# Patient Record
Sex: MALE | Race: WHITE | Employment: FULL TIME | URBAN - METROPOLITAN AREA
[De-identification: names, ages, dates, MRNs, and addresses within clinical notes are randomized per-mention and may not be internally consistent; named-entity substitution may affect disease eponyms.]

---

## 2019-04-18 PROCEDURE — 81003 URINALYSIS AUTO W/O SCOPE: CPT | Performed by: INTERNAL MEDICINE

## 2020-02-17 PROBLEM — H93.12 TINNITUS, LEFT EAR: Status: ACTIVE | Noted: 2020-02-17

## 2021-10-18 ENCOUNTER — APPOINTMENT (OUTPATIENT)
Dept: GENERAL RADIOLOGY | Facility: HOSPITAL | Age: 36
End: 2021-10-18
Attending: EMERGENCY MEDICINE
Payer: COMMERCIAL

## 2021-10-18 ENCOUNTER — HOSPITAL ENCOUNTER (EMERGENCY)
Facility: HOSPITAL | Age: 36
Discharge: HOME | End: 2021-10-18
Attending: EMERGENCY MEDICINE
Payer: COMMERCIAL

## 2021-10-18 VITALS
HEART RATE: 63 BPM | BODY MASS INDEX: 35.79 KG/M2 | HEIGHT: 70 IN | OXYGEN SATURATION: 98 % | SYSTOLIC BLOOD PRESSURE: 145 MMHG | TEMPERATURE: 98 F | DIASTOLIC BLOOD PRESSURE: 96 MMHG | RESPIRATION RATE: 16 BRPM | WEIGHT: 250 LBS

## 2021-10-18 DIAGNOSIS — R03.0 ELEVATED BLOOD PRESSURE READING IN OFFICE WITHOUT DIAGNOSIS OF HYPERTENSION: Primary | ICD-10-CM

## 2021-10-18 PROCEDURE — 36415 COLL VENOUS BLD VENIPUNCTURE: CPT

## 2021-10-18 PROCEDURE — 85025 COMPLETE CBC W/AUTO DIFF WBC: CPT

## 2021-10-18 PROCEDURE — 80048 BASIC METABOLIC PNL TOTAL CA: CPT | Performed by: EMERGENCY MEDICINE

## 2021-10-18 PROCEDURE — 80048 BASIC METABOLIC PNL TOTAL CA: CPT

## 2021-10-18 PROCEDURE — 85379 FIBRIN DEGRADATION QUANT: CPT | Performed by: EMERGENCY MEDICINE

## 2021-10-18 PROCEDURE — 85025 COMPLETE CBC W/AUTO DIFF WBC: CPT | Performed by: EMERGENCY MEDICINE

## 2021-10-18 PROCEDURE — 84484 ASSAY OF TROPONIN QUANT: CPT

## 2021-10-18 PROCEDURE — 71045 X-RAY EXAM CHEST 1 VIEW: CPT | Performed by: EMERGENCY MEDICINE

## 2021-10-18 PROCEDURE — 99284 EMERGENCY DEPT VISIT MOD MDM: CPT

## 2021-10-18 PROCEDURE — 93010 ELECTROCARDIOGRAM REPORT: CPT | Performed by: EMERGENCY MEDICINE

## 2021-10-18 PROCEDURE — 93005 ELECTROCARDIOGRAM TRACING: CPT

## 2021-10-18 PROCEDURE — 84484 ASSAY OF TROPONIN QUANT: CPT | Performed by: EMERGENCY MEDICINE

## 2021-10-18 RX ORDER — AMLODIPINE BESYLATE 5 MG/1
5 TABLET ORAL DAILY
Qty: 30 TABLET | Refills: 0 | Status: SHIPPED | OUTPATIENT
Start: 2021-10-18 | End: 2021-11-17

## 2021-10-18 NOTE — ED PROVIDER NOTES
Patient Seen in: [facility] AND [facility] Emergency Department      History   Patient presents with:  Chest Pain Angina    Stated Complaint: chest pain    Subjective:   HPI    The patient is a 41-year-old male who presents with intermittently elevated blood pre kg   SpO2 99%   BMI 35.87 kg/m²         Physical Exam  Vitals and nursing note reviewed. Constitutional:       General: He is not in acute distress. Appearance: Normal appearance. He is well-developed. He is not ill-appearing.    HENT:      Head: Norm Neutrophil Absolute 9.71 (*)     All other components within normal limits   TROPONIN I - Normal   D-DIMER - Normal   CBC WITH DIFFERENTIAL WITH PLATELET    Narrative:      The following orders were created for panel order CBC With Differential With Platele under    Risk factors   1  +2 for 3 or more  +1 for 1-2  +0 for 0  Hypercholesterolemia  Hypertension  Diabetes Mellitus  Cigarette smoking  Positive family history  Obesity    Troponin 0  +2 3x upper limit or more  +1 1-3x upper limit  +0  Less than or eq

## 2021-10-18 NOTE — ED INITIAL ASSESSMENT (HPI)
[place] [facility] he reports htn for the past several weeks post covid. Pt now reports chest discomfort and sob that began today.  [place] sent [place] [facility] [place] evaluation

## 2021-10-27 PROBLEM — E78.2 MIXED HYPERLIPIDEMIA: Status: ACTIVE | Noted: 2021-10-27

## 2022-02-15 ENCOUNTER — APPOINTMENT (OUTPATIENT)
Dept: CARDIOLOGY | Age: 37
End: 2022-02-15

## 2022-02-22 ENCOUNTER — OFFICE VISIT (OUTPATIENT)
Dept: CARDIOLOGY | Age: 37
End: 2022-02-22

## 2022-02-22 VITALS
HEART RATE: 55 BPM | HEIGHT: 70 IN | RESPIRATION RATE: 16 BRPM | BODY MASS INDEX: 32.07 KG/M2 | SYSTOLIC BLOOD PRESSURE: 122 MMHG | DIASTOLIC BLOOD PRESSURE: 80 MMHG | WEIGHT: 224 LBS

## 2022-02-22 DIAGNOSIS — I10 UNCONTROLLED HYPERTENSION: Primary | ICD-10-CM

## 2022-02-22 PROCEDURE — 3079F DIAST BP 80-89 MM HG: CPT | Performed by: INTERNAL MEDICINE

## 2022-02-22 PROCEDURE — 3074F SYST BP LT 130 MM HG: CPT | Performed by: INTERNAL MEDICINE

## 2022-02-22 PROCEDURE — 93000 ELECTROCARDIOGRAM COMPLETE: CPT | Performed by: INTERNAL MEDICINE

## 2022-02-22 PROCEDURE — 99244 OFF/OP CNSLTJ NEW/EST MOD 40: CPT | Performed by: INTERNAL MEDICINE

## 2022-02-22 RX ORDER — BISOPROLOL FUMARATE AND HYDROCHLOROTHIAZIDE 5; 6.25 MG/1; MG/1
TABLET ORAL DAILY
COMMUNITY
Start: 2021-11-30

## 2022-02-22 SDOH — HEALTH STABILITY: PHYSICAL HEALTH: ON AVERAGE, HOW MANY DAYS PER WEEK DO YOU ENGAGE IN MODERATE TO STRENUOUS EXERCISE (LIKE A BRISK WALK)?: 7 DAYS

## 2022-02-22 ASSESSMENT — PATIENT HEALTH QUESTIONNAIRE - PHQ9
SUM OF ALL RESPONSES TO PHQ9 QUESTIONS 1 AND 2: 0
SUM OF ALL RESPONSES TO PHQ9 QUESTIONS 1 AND 2: 0
CLINICAL INTERPRETATION OF PHQ2 SCORE: NO FURTHER SCREENING NEEDED
1. LITTLE INTEREST OR PLEASURE IN DOING THINGS: NOT AT ALL
2. FEELING DOWN, DEPRESSED OR HOPELESS: NOT AT ALL

## 2022-03-08 ENCOUNTER — HOSPITAL ENCOUNTER (OUTPATIENT)
Dept: ULTRASOUND IMAGING | Age: 37
Discharge: HOME | End: 2022-03-08
Attending: INTERNAL MEDICINE

## 2022-03-08 DIAGNOSIS — I10 UNCONTROLLED HYPERTENSION: ICD-10-CM

## 2022-03-08 PROCEDURE — 93975 VASCULAR STUDY: CPT

## 2022-03-18 ENCOUNTER — HOSPITAL ENCOUNTER (OUTPATIENT)
Dept: CARDIOLOGY | Age: 37
Discharge: HOME | End: 2022-03-18
Attending: INTERNAL MEDICINE

## 2022-03-18 DIAGNOSIS — I10 UNCONTROLLED HYPERTENSION: ICD-10-CM

## 2022-03-18 LAB
AORTIC VALVE AREA: NORMAL
AV MEAN GRADIENT (AVMG): 3
AV MEAN VELOCITY (AVMV): 0.8
AV PEAK GRADIENT (AVPG): 6
AV PEAK VELOCITY (AVPV): 1.2
AV STENOSIS SEVERITY TEXT: NORMAL
LV EF: NORMAL %

## 2022-03-18 PROCEDURE — 93306 TTE W/DOPPLER COMPLETE: CPT | Performed by: INTERNAL MEDICINE

## 2022-03-18 PROCEDURE — 93306 TTE W/DOPPLER COMPLETE: CPT

## 2022-03-18 PROCEDURE — 76376 3D RENDER W/INTRP POSTPROCES: CPT | Performed by: INTERNAL MEDICINE

## 2022-03-18 ASSESSMENT — PATIENT HEALTH QUESTIONNAIRE - PHQ9
CLINICAL INTERPRETATION OF PHQ2 SCORE: NO FURTHER SCREENING NEEDED
SUM OF ALL RESPONSES TO PHQ9 QUESTIONS 1 AND 2: 0
1. LITTLE INTEREST OR PLEASURE IN DOING THINGS: NOT AT ALL
2. FEELING DOWN, DEPRESSED OR HOPELESS: NOT AT ALL
SUM OF ALL RESPONSES TO PHQ9 QUESTIONS 1 AND 2: 0

## 2022-03-22 ENCOUNTER — OFFICE VISIT (OUTPATIENT)
Dept: CARDIOLOGY | Age: 37
End: 2022-03-22

## 2022-03-22 VITALS
HEIGHT: 70 IN | BODY MASS INDEX: 32.19 KG/M2 | SYSTOLIC BLOOD PRESSURE: 124 MMHG | HEART RATE: 60 BPM | WEIGHT: 224.87 LBS | RESPIRATION RATE: 16 BRPM | DIASTOLIC BLOOD PRESSURE: 80 MMHG

## 2022-03-22 DIAGNOSIS — I10 PRIMARY HYPERTENSION: Primary | ICD-10-CM

## 2022-03-22 PROCEDURE — 3074F SYST BP LT 130 MM HG: CPT | Performed by: INTERNAL MEDICINE

## 2022-03-22 PROCEDURE — 3079F DIAST BP 80-89 MM HG: CPT | Performed by: INTERNAL MEDICINE

## 2022-03-22 PROCEDURE — 99214 OFFICE O/P EST MOD 30 MIN: CPT | Performed by: INTERNAL MEDICINE

## 2022-03-22 SDOH — HEALTH STABILITY: PHYSICAL HEALTH: ON AVERAGE, HOW MANY DAYS PER WEEK DO YOU ENGAGE IN MODERATE TO STRENUOUS EXERCISE (LIKE A BRISK WALK)?: 2 DAYS

## 2025-02-21 RX ORDER — BISOPROLOL FUMARATE AND HYDROCHLOROTHIAZIDE 5; 6.25 MG/1; MG/1
1 TABLET ORAL DAILY
COMMUNITY
Start: 2024-11-21

## 2025-02-24 RX ORDER — METRONIDAZOLE 500 MG/1
TABLET ORAL
COMMUNITY
Start: 2025-01-27 | End: 2025-03-02

## 2025-02-24 RX ORDER — SODIUM CHLORIDE, SODIUM LACTATE, POTASSIUM CHLORIDE, CALCIUM CHLORIDE 600; 310; 30; 20 MG/100ML; MG/100ML; MG/100ML; MG/100ML
INJECTION, SOLUTION INTRAVENOUS CONTINUOUS
Status: CANCELLED | OUTPATIENT
Start: 2025-02-24

## 2025-02-24 RX ORDER — NEOMYCIN SULFATE 500 MG/1
TABLET ORAL
COMMUNITY
Start: 2025-01-27 | End: 2025-03-02

## 2025-02-25 ENCOUNTER — LAB ENCOUNTER (OUTPATIENT)
Dept: LAB | Facility: HOSPITAL | Age: 40
End: 2025-02-25
Attending: SURGERY
Payer: COMMERCIAL

## 2025-02-25 DIAGNOSIS — Z01.818 PREOP TESTING: ICD-10-CM

## 2025-02-25 LAB
ANTIBODY SCREEN: NEGATIVE
RH BLOOD TYPE: POSITIVE

## 2025-02-25 PROCEDURE — 36415 COLL VENOUS BLD VENIPUNCTURE: CPT

## 2025-02-25 PROCEDURE — 86850 RBC ANTIBODY SCREEN: CPT

## 2025-02-25 PROCEDURE — 86901 BLOOD TYPING SEROLOGIC RH(D): CPT

## 2025-02-25 PROCEDURE — 86900 BLOOD TYPING SEROLOGIC ABO: CPT

## 2025-02-28 ENCOUNTER — ANESTHESIA EVENT (OUTPATIENT)
Dept: SURGERY | Facility: HOSPITAL | Age: 40
End: 2025-02-28
Payer: COMMERCIAL

## 2025-02-28 ENCOUNTER — HOSPITAL ENCOUNTER (INPATIENT)
Facility: HOSPITAL | Age: 40
LOS: 2 days | Discharge: HOME | End: 2025-03-02
Attending: SURGERY | Admitting: SURGERY
Payer: COMMERCIAL

## 2025-02-28 ENCOUNTER — ANESTHESIA (OUTPATIENT)
Dept: SURGERY | Facility: HOSPITAL | Age: 40
End: 2025-02-28
Payer: COMMERCIAL

## 2025-02-28 DIAGNOSIS — Z01.818 PREOP TESTING: Primary | ICD-10-CM

## 2025-02-28 DIAGNOSIS — K57.32 DIVERTICULITIS LARGE INTESTINE W/O PERFORATION OR ABSCESS W/O BLEEDING: ICD-10-CM

## 2025-02-28 LAB
GLUCOSE BLDC GLUCOMTR-MCNC: 132 MG/DL (ref 70–99)
RH BLOOD TYPE: POSITIVE

## 2025-02-28 PROCEDURE — 4A1BXSH MONITORING OF GASTROINTESTINAL VASCULAR PERFUSION USING INDOCYANINE GREEN DYE, EXTERNAL APPROACH: ICD-10-PCS | Performed by: SURGERY

## 2025-02-28 PROCEDURE — 0DTN4ZZ RESECTION OF SIGMOID COLON, PERCUTANEOUS ENDOSCOPIC APPROACH: ICD-10-PCS | Performed by: SURGERY

## 2025-02-28 PROCEDURE — 82962 GLUCOSE BLOOD TEST: CPT

## 2025-02-28 PROCEDURE — 88307 TISSUE EXAM BY PATHOLOGIST: CPT | Performed by: SURGERY

## 2025-02-28 PROCEDURE — 0DJD8ZZ INSPECTION OF LOWER INTESTINAL TRACT, VIA NATURAL OR ARTIFICIAL OPENING ENDOSCOPIC: ICD-10-PCS | Performed by: SURGERY

## 2025-02-28 RX ORDER — METOCLOPRAMIDE HYDROCHLORIDE 5 MG/ML
10 INJECTION INTRAMUSCULAR; INTRAVENOUS ONCE
Status: COMPLETED | OUTPATIENT
Start: 2025-02-28 | End: 2025-02-28

## 2025-02-28 RX ORDER — SODIUM CHLORIDE 9 MG/ML
INJECTION, SOLUTION INTRAVENOUS CONTINUOUS PRN
Status: DISCONTINUED | OUTPATIENT
Start: 2025-02-28 | End: 2025-02-28 | Stop reason: SURG

## 2025-02-28 RX ORDER — KETOROLAC TROMETHAMINE 30 MG/ML
INJECTION, SOLUTION INTRAMUSCULAR; INTRAVENOUS AS NEEDED
Status: DISCONTINUED | OUTPATIENT
Start: 2025-02-28 | End: 2025-02-28 | Stop reason: SURG

## 2025-02-28 RX ORDER — ACETAMINOPHEN 500 MG
1000 TABLET ORAL 3 TIMES DAILY
Status: DISCONTINUED | OUTPATIENT
Start: 2025-02-28 | End: 2025-03-02

## 2025-02-28 RX ORDER — ONDANSETRON 2 MG/ML
4 INJECTION INTRAMUSCULAR; INTRAVENOUS EVERY 6 HOURS PRN
Status: DISCONTINUED | OUTPATIENT
Start: 2025-02-28 | End: 2025-02-28 | Stop reason: HOSPADM

## 2025-02-28 RX ORDER — NALOXONE HYDROCHLORIDE 0.4 MG/ML
80 INJECTION, SOLUTION INTRAMUSCULAR; INTRAVENOUS; SUBCUTANEOUS AS NEEDED
Status: DISCONTINUED | OUTPATIENT
Start: 2025-02-28 | End: 2025-02-28 | Stop reason: HOSPADM

## 2025-02-28 RX ORDER — ROCURONIUM BROMIDE 10 MG/ML
INJECTION, SOLUTION INTRAVENOUS AS NEEDED
Status: DISCONTINUED | OUTPATIENT
Start: 2025-02-28 | End: 2025-02-28 | Stop reason: SURG

## 2025-02-28 RX ORDER — BUPIVACAINE HYDROCHLORIDE 2.5 MG/ML
INJECTION, SOLUTION EPIDURAL; INFILTRATION; INTRACAUDAL AS NEEDED
Status: DISCONTINUED | OUTPATIENT
Start: 2025-02-28 | End: 2025-02-28 | Stop reason: HOSPADM

## 2025-02-28 RX ORDER — METOCLOPRAMIDE 10 MG/1
10 TABLET ORAL ONCE
Status: COMPLETED | OUTPATIENT
Start: 2025-02-28 | End: 2025-02-28

## 2025-02-28 RX ORDER — MORPHINE SULFATE 4 MG/ML
2 INJECTION, SOLUTION INTRAMUSCULAR; INTRAVENOUS EVERY 10 MIN PRN
Status: DISCONTINUED | OUTPATIENT
Start: 2025-02-28 | End: 2025-02-28 | Stop reason: HOSPADM

## 2025-02-28 RX ORDER — PROCHLORPERAZINE EDISYLATE 5 MG/ML
5 INJECTION INTRAMUSCULAR; INTRAVENOUS EVERY 8 HOURS PRN
Status: DISCONTINUED | OUTPATIENT
Start: 2025-02-28 | End: 2025-02-28 | Stop reason: HOSPADM

## 2025-02-28 RX ORDER — HYDROMORPHONE HYDROCHLORIDE 1 MG/ML
0.8 INJECTION, SOLUTION INTRAMUSCULAR; INTRAVENOUS; SUBCUTANEOUS EVERY 2 HOUR PRN
Status: DISCONTINUED | OUTPATIENT
Start: 2025-02-28 | End: 2025-03-02

## 2025-02-28 RX ORDER — HYDROMORPHONE HYDROCHLORIDE 1 MG/ML
0.2 INJECTION, SOLUTION INTRAMUSCULAR; INTRAVENOUS; SUBCUTANEOUS EVERY 5 MIN PRN
Status: DISCONTINUED | OUTPATIENT
Start: 2025-02-28 | End: 2025-02-28 | Stop reason: HOSPADM

## 2025-02-28 RX ORDER — LIDOCAINE HYDROCHLORIDE ANHYDROUS AND DEXTROSE MONOHYDRATE .8; 5 G/100ML; G/100ML
INJECTION, SOLUTION INTRAVENOUS CONTINUOUS PRN
Status: DISCONTINUED | OUTPATIENT
Start: 2025-02-28 | End: 2025-02-28 | Stop reason: SURG

## 2025-02-28 RX ORDER — FAMOTIDINE 20 MG/1
20 TABLET, FILM COATED ORAL ONCE
Status: COMPLETED | OUTPATIENT
Start: 2025-02-28 | End: 2025-02-28

## 2025-02-28 RX ORDER — SODIUM CHLORIDE, SODIUM LACTATE, POTASSIUM CHLORIDE, CALCIUM CHLORIDE 600; 310; 30; 20 MG/100ML; MG/100ML; MG/100ML; MG/100ML
INJECTION, SOLUTION INTRAVENOUS CONTINUOUS
Status: DISCONTINUED | OUTPATIENT
Start: 2025-02-28 | End: 2025-02-28 | Stop reason: HOSPADM

## 2025-02-28 RX ORDER — HEPARIN SODIUM 5000 [USP'U]/ML
5000 INJECTION, SOLUTION INTRAVENOUS; SUBCUTANEOUS ONCE
Status: COMPLETED | OUTPATIENT
Start: 2025-02-28 | End: 2025-02-28

## 2025-02-28 RX ORDER — LIDOCAINE HYDROCHLORIDE 10 MG/ML
INJECTION, SOLUTION EPIDURAL; INFILTRATION; INTRACAUDAL; PERINEURAL AS NEEDED
Status: DISCONTINUED | OUTPATIENT
Start: 2025-02-28 | End: 2025-02-28 | Stop reason: SURG

## 2025-02-28 RX ORDER — ACETAMINOPHEN 500 MG
1000 TABLET ORAL ONCE
Status: COMPLETED | OUTPATIENT
Start: 2025-02-28 | End: 2025-02-28

## 2025-02-28 RX ORDER — IBUPROFEN 600 MG/1
600 TABLET, FILM COATED ORAL EVERY 6 HOURS PRN
Status: DISCONTINUED | OUTPATIENT
Start: 2025-02-28 | End: 2025-03-02

## 2025-02-28 RX ORDER — MAGNESIUM OXIDE 400 MG/1
400 TABLET ORAL DAILY
Status: DISCONTINUED | OUTPATIENT
Start: 2025-02-28 | End: 2025-03-02

## 2025-02-28 RX ORDER — ONDANSETRON 2 MG/ML
4 INJECTION INTRAMUSCULAR; INTRAVENOUS EVERY 6 HOURS PRN
Status: DISCONTINUED | OUTPATIENT
Start: 2025-02-28 | End: 2025-03-02

## 2025-02-28 RX ORDER — MIDAZOLAM HYDROCHLORIDE 1 MG/ML
INJECTION INTRAMUSCULAR; INTRAVENOUS AS NEEDED
Status: DISCONTINUED | OUTPATIENT
Start: 2025-02-28 | End: 2025-02-28 | Stop reason: SURG

## 2025-02-28 RX ORDER — HYDROMORPHONE HYDROCHLORIDE 1 MG/ML
0.4 INJECTION, SOLUTION INTRAMUSCULAR; INTRAVENOUS; SUBCUTANEOUS EVERY 2 HOUR PRN
Status: DISCONTINUED | OUTPATIENT
Start: 2025-02-28 | End: 2025-03-02

## 2025-02-28 RX ORDER — PHENYLEPHRINE HCL 10 MG/ML
VIAL (ML) INJECTION AS NEEDED
Status: DISCONTINUED | OUTPATIENT
Start: 2025-02-28 | End: 2025-02-28 | Stop reason: SURG

## 2025-02-28 RX ORDER — METRONIDAZOLE 500 MG/100ML
500 INJECTION, SOLUTION INTRAVENOUS ONCE
Status: COMPLETED | OUTPATIENT
Start: 2025-02-28 | End: 2025-02-28

## 2025-02-28 RX ORDER — HEPARIN SODIUM 5000 [USP'U]/ML
5000 INJECTION, SOLUTION INTRAVENOUS; SUBCUTANEOUS EVERY 8 HOURS SCHEDULED
Status: DISCONTINUED | OUTPATIENT
Start: 2025-03-01 | End: 2025-03-02

## 2025-02-28 RX ORDER — HYDROMORPHONE HYDROCHLORIDE 1 MG/ML
0.4 INJECTION, SOLUTION INTRAMUSCULAR; INTRAVENOUS; SUBCUTANEOUS EVERY 5 MIN PRN
Status: DISCONTINUED | OUTPATIENT
Start: 2025-02-28 | End: 2025-02-28 | Stop reason: HOSPADM

## 2025-02-28 RX ORDER — SODIUM CHLORIDE 9 MG/ML
INJECTION, SOLUTION INTRAVENOUS CONTINUOUS
Status: DISCONTINUED | OUTPATIENT
Start: 2025-02-28 | End: 2025-03-02

## 2025-02-28 RX ORDER — HYDROMORPHONE HYDROCHLORIDE 1 MG/ML
0.6 INJECTION, SOLUTION INTRAMUSCULAR; INTRAVENOUS; SUBCUTANEOUS EVERY 5 MIN PRN
Status: DISCONTINUED | OUTPATIENT
Start: 2025-02-28 | End: 2025-02-28 | Stop reason: HOSPADM

## 2025-02-28 RX ORDER — TRAMADOL HYDROCHLORIDE 50 MG/1
50 TABLET ORAL EVERY 6 HOURS PRN
Status: DISCONTINUED | OUTPATIENT
Start: 2025-02-28 | End: 2025-03-02

## 2025-02-28 RX ORDER — MORPHINE SULFATE 4 MG/ML
4 INJECTION, SOLUTION INTRAMUSCULAR; INTRAVENOUS EVERY 10 MIN PRN
Status: DISCONTINUED | OUTPATIENT
Start: 2025-02-28 | End: 2025-02-28 | Stop reason: HOSPADM

## 2025-02-28 RX ORDER — DEXAMETHASONE SODIUM PHOSPHATE 4 MG/ML
VIAL (ML) INJECTION AS NEEDED
Status: DISCONTINUED | OUTPATIENT
Start: 2025-02-28 | End: 2025-02-28 | Stop reason: SURG

## 2025-02-28 RX ORDER — SODIUM CHLORIDE, SODIUM LACTATE, POTASSIUM CHLORIDE, CALCIUM CHLORIDE 600; 310; 30; 20 MG/100ML; MG/100ML; MG/100ML; MG/100ML
2 INJECTION, SOLUTION INTRAVENOUS CONTINUOUS
Status: DISCONTINUED | OUTPATIENT
Start: 2025-02-28 | End: 2025-03-02

## 2025-02-28 RX ORDER — ONDANSETRON 2 MG/ML
INJECTION INTRAMUSCULAR; INTRAVENOUS AS NEEDED
Status: DISCONTINUED | OUTPATIENT
Start: 2025-02-28 | End: 2025-02-28 | Stop reason: SURG

## 2025-02-28 RX ORDER — FAMOTIDINE 10 MG/ML
20 INJECTION, SOLUTION INTRAVENOUS ONCE
Status: COMPLETED | OUTPATIENT
Start: 2025-02-28 | End: 2025-02-28

## 2025-02-28 RX ORDER — OXYCODONE HYDROCHLORIDE 5 MG/1
10 TABLET ORAL EVERY 4 HOURS PRN
Status: DISCONTINUED | OUTPATIENT
Start: 2025-02-28 | End: 2025-03-02

## 2025-02-28 RX ORDER — OXYCODONE HYDROCHLORIDE 5 MG/1
5 TABLET ORAL EVERY 4 HOURS PRN
Status: DISCONTINUED | OUTPATIENT
Start: 2025-02-28 | End: 2025-03-02

## 2025-02-28 RX ORDER — MORPHINE SULFATE 10 MG/ML
6 INJECTION, SOLUTION INTRAMUSCULAR; INTRAVENOUS EVERY 10 MIN PRN
Status: DISCONTINUED | OUTPATIENT
Start: 2025-02-28 | End: 2025-02-28 | Stop reason: HOSPADM

## 2025-02-28 RX ORDER — SODIUM CHLORIDE, SODIUM LACTATE, POTASSIUM CHLORIDE, CALCIUM CHLORIDE 600; 310; 30; 20 MG/100ML; MG/100ML; MG/100ML; MG/100ML
INJECTION, SOLUTION INTRAVENOUS CONTINUOUS PRN
Status: DISCONTINUED | OUTPATIENT
Start: 2025-02-28 | End: 2025-02-28

## 2025-02-28 RX ORDER — GLYCOPYRROLATE 0.2 MG/ML
INJECTION, SOLUTION INTRAMUSCULAR; INTRAVENOUS AS NEEDED
Status: DISCONTINUED | OUTPATIENT
Start: 2025-02-28 | End: 2025-02-28 | Stop reason: SURG

## 2025-02-28 RX ADMIN — PHENYLEPHRINE HCL 100 MCG: 10 MG/ML VIAL (ML) INJECTION at 08:32:00

## 2025-02-28 RX ADMIN — SODIUM CHLORIDE: 9 INJECTION, SOLUTION INTRAVENOUS at 07:36:00

## 2025-02-28 RX ADMIN — DEXAMETHASONE SODIUM PHOSPHATE 4 MG: 4 MG/ML VIAL (ML) INJECTION at 07:39:00

## 2025-02-28 RX ADMIN — LIDOCAINE HYDROCHLORIDE 50 MG: 10 INJECTION, SOLUTION EPIDURAL; INFILTRATION; INTRACAUDAL; PERINEURAL at 07:39:00

## 2025-02-28 RX ADMIN — PHENYLEPHRINE HCL 100 MCG: 10 MG/ML VIAL (ML) INJECTION at 08:33:00

## 2025-02-28 RX ADMIN — PHENYLEPHRINE HCL 100 MCG: 10 MG/ML VIAL (ML) INJECTION at 09:43:00

## 2025-02-28 RX ADMIN — SODIUM CHLORIDE: 9 INJECTION, SOLUTION INTRAVENOUS at 10:21:00

## 2025-02-28 RX ADMIN — PHENYLEPHRINE HCL 100 MCG: 10 MG/ML VIAL (ML) INJECTION at 08:27:00

## 2025-02-28 RX ADMIN — ROCURONIUM BROMIDE 20 MG: 10 INJECTION, SOLUTION INTRAVENOUS at 09:30:00

## 2025-02-28 RX ADMIN — METRONIDAZOLE 500 MG: 500 INJECTION, SOLUTION INTRAVENOUS at 07:50:00

## 2025-02-28 RX ADMIN — GLYCOPYRROLATE 0.2 MG: 0.2 INJECTION, SOLUTION INTRAMUSCULAR; INTRAVENOUS at 07:39:00

## 2025-02-28 RX ADMIN — SODIUM CHLORIDE: 9 INJECTION, SOLUTION INTRAVENOUS at 09:19:00

## 2025-02-28 RX ADMIN — ONDANSETRON 4 MG: 2 INJECTION INTRAMUSCULAR; INTRAVENOUS at 09:43:00

## 2025-02-28 RX ADMIN — KETOROLAC TROMETHAMINE 30 MG: 30 INJECTION, SOLUTION INTRAMUSCULAR; INTRAVENOUS at 10:23:00

## 2025-02-28 RX ADMIN — LIDOCAINE HYDROCHLORIDE ANHYDROUS AND DEXTROSE MONOHYDRATE 1 MG/MIN: .8; 5 INJECTION, SOLUTION INTRAVENOUS at 07:52:00

## 2025-02-28 RX ADMIN — ROCURONIUM BROMIDE 60 MG: 10 INJECTION, SOLUTION INTRAVENOUS at 07:39:00

## 2025-02-28 RX ADMIN — ROCURONIUM BROMIDE 20 MG: 10 INJECTION, SOLUTION INTRAVENOUS at 08:48:00

## 2025-02-28 RX ADMIN — SODIUM CHLORIDE: 9 INJECTION, SOLUTION INTRAVENOUS at 07:50:00

## 2025-02-28 RX ADMIN — MIDAZOLAM HYDROCHLORIDE 2 MG: 1 INJECTION INTRAMUSCULAR; INTRAVENOUS at 07:38:00

## 2025-02-28 NOTE — ANESTHESIA POSTPROCEDURE EVALUATION
Patient: Dung Silva    Procedure Summary       Date: 02/28/25 Room / Location: [facility] MAIN OR 03 / [facility] MAIN OR    Anesthesia Start: 0736 Anesthesia Stop:     Procedure: Laparoscopic sigmoid colon resection (Abdomen) Diagnosis: (Diverticulitis)    Surgeons: Teofilo Escobedo MD Anesthesiologist: Maximus Downs MD    Anesthesia Type: general ASA Status: 2            Anesthesia Type: general    Vitals Value Taken Time   /117 02/28/25 1029   Temp 99 °F (37.2 °C) 02/28/25 1029   Pulse 88 02/28/25 1029   Resp 20 02/28/25 1029   SpO2 98 % 02/28/25 1029   Vitals shown include unfiled device data.    EM AN Post Evaluation:   Patient Evaluated in PACU  Patient Participation: complete - patient participated  Level of Consciousness: sleepy but conscious  Pain Score: 0  Pain Management: adequate  Airway Patency:patent  Dental exam unchanged from preop  Yes    Cardiovascular Status: stable and acceptable  Respiratory Status: acceptable and room air  Postoperative Hydration acceptable      MICKY JOHNSON CRNA  2/28/2025 10:30 AM

## 2025-02-28 NOTE — H&P
HPI: 39-year-old male patient of Dr. Sanchez presents for evaluation of sigmoid diverticulitis.    He was recently admitted to [facility] for 4 days for diverticulitis. He states the symptoms started on December 17 as discomfort, and within a day he had markedly increased pain and a fever. He was admitted to the hospital from days 2-6 of the attack. He now feels better.  He was seen by Dr. Swann from surgery. He was discharged on IV antibiotics through Dr. Epifanio Overton at [facility]. The patient is administering the antibiotics himself. The plan is for 4 weeks of IV antibiotics. Planning a follow-up CT in 2 weeks, and colonoscopy and surgery in 6 weeks.  Now, he has no significant pain, eating pretty much normally, having normal bowel movements.  In general, he states he has regular bowel movements. He has some digestive issues, but nothing like this. Generally feels he has a healthy gastrointestinal tract. He states he eats eclectically. This was his first episode. He has never had a colonoscopy.    Past surgical history: Negative  Family history: , runs [employer], able to do it with light duty if need be  Family history: Prevalent colon issues. History of colon and pancreatic cancer. He his grandfather, mother, and uncle had diverticulitis issues.    Unaccompanied.    ALLERGIES:  Lisinopril PALPITATIONS, OTHER (SEE COMMENTS),  UNKNOWN  Comment:Night terrors sweats  Codeine RASH  CURRENT MEDS:  Current Outpatient Medications   Medication Sig Dispense Refill   neomycin 500 MG Oral Tab Take 2PO the day before surgery at 5,7,9 PM 6 tablet 0   metRONIDAZOLE (FLAGYL) 500 MG Oral Tab Take 1PO the day before surgery at 5,7,9 PM 3 tablet 0   bisoprolol-hydrochlorothiazide 5-6.25 MG Oral Tab Take 1 tablet by mouth daily. 90 tablet 1   Halobetasol Propionate (ULTRAVATE) 0.05 % External Ointment Apply to rash areas on hands, feet, and legs twice daily 50 g 2   Meloxicam 15 MG Oral Tab Take 15 mg by mouth  daily.   ALPRAZolam 0.5 MG Oral Tab Take 1 tablet (0.5 mg total) by mouth 2 (two) times daily as needed. 10 tablet 0     ________________________________________________________________________  ROS:  GENERAL HEALTH: otherwise feels well  HEENT: denies nasal congestion, sinus pain or sore throat; hearing loss negative  RESPIRATORY: denies shortness of breath, wheezing or cough   CARDIOVASCULAR: denies chest pain or OZUNA; no palpitations   GI: denies nausea, vomiting, constipation, diarrhea; no rectal bleeding; no heartburn--see HPI  GENITAL/: no dysuria, urgency or frequency  HEMATOLOGY: denies hx anemia; denies bruising or excessive bleeding  ________________________________________________________________________  PHYSICAL EXAM:  GENERAL: well developed, well nourished male, in no apparent distress  NECK: supple; no JVD  RESPIRATORY: lungs clear bilaterally  CARDIOVASCULAR: RRR  ABDOMEN: normal active BS+, soft, nondistended; no masses; trace subjective \"awareness\" with deep palpation of the left lower quadrant, \"not pain\"  GENITAL/: no inguinal hernias  RECTAL: not examined  LYMPHATIC: no lymphadenopathy  MUSCULOSKELETAL: Right upper extremity PICC line    CT ABDOMEN+PELVIS(CONTRAST ONLY)(CPT=74177)  Order: [id number]  Impression    1. Marked thickening of the sigmoid colon with extensive pericolonic fat stranding and extraluminal air which may be due to perforated diverticulitis. Differential would also include colitis and neoplasm. Fluid in the ascending colon which may be due to diarrhea.  2. Bilateral 1-2 mm pulmonary nodules. If patient is low risk without risk factors such as smoking or significant radon exposure, no routine follow up is recommended. If patient is high risk, consider optional CT at 12 months. (Guidelines for Management of Incidental Pulmonary Nodules Detected on CT images: From the Fleischner Society 2017: Radiology Volume 284 Issue 1 July 2017)  3. Findings of perforated diverticulitis  communicated with Teodoro Ennis, physician assistant by Epic message at 1305 on 12/19/2024    Created by: Maria E Tierney MD  Signed by: Maria E Tierney MD  Signed on: 12/19/2024 1:14 PM  Location: [place] [id number]      Narrative    Exam Description: CT ABDOMEN PELVIS W IV CONTRAST  Exam Time (approximate): 12/19/2024 12:44 PM  Reason for Study: abd pain  HISTORY: Periumbilical pain for several days with irregularity of the bowel. History of hematuria    COMPARISON: 5/27/2023 CT chest abdomen pelvis    TECHNIQUE: CT of the abdomen and pelvis was obtained with coronal reconstructions following the administration of only intravenous contrast. Absence of oral contrast limits evaluation of the stomach, small bowel and colon. 100 cc of Isovue-370 was injected. Dose reduction techniques employed include automatic exposure control and adjustment of kV and mA for body weight.    DOSE: Total exam DLP is 1117.20 mGy-cm.    FINDINGS:  Lung bases: 2 mm right costophrenic angle nodule image 25 series 4. 1 mm right middle lobe nodule image 8 series 4. 2 mm left lower lobe nodule image 11 series 4. 2 mm left lower lobe subpleural nodule image 14  Pneumoperitoneum: Extraluminal air in the mesentery  Esophagus/stomach: Small amount of air in the distal esophagus. Collapsed stomach with small amount of air and fluid.    Liver: No focal lesions.  Gallbladder/biliary: No radiodense calculi. No common duct dilatation.    Spleen: Normal  Pancreas: Normal  Adrenals: Normal right adrenal. Minimal thickening of the left lateral adrenal limb    Kidneys: Symmetrical excretion without hydronephrosis    Upper abdominal/retroperitoneal lymph nodes: Small. Not enlarged.  Pelvic/inguinal nodes: Not enlarged    Anterior abdominal wall/soft tissues: Tiny amount of fat at the umbilicus without significant hernia  Vascular: Normal caliber aorta    Pelvis: Seminal vesicles and prostate normal for age. Trace pelvic free fluid  Bladder: Mild bladder wall  thickening with mild bladder distention    Peritoneum/mesentery: Extraluminal air, soft tissue stranding.    GI tract: Marked thickening of the sigmoid colon with extensive pericolonic stranding and foci of extraluminal air predominantly posterior and superior to the sigmoid which may be sequela of diverticulitis but could also be sequela of colitis. Recommend follow up to exclude underlying neoplasm. Tiny amount of fluid in the distal collapsed rectosigmoid. Collapsed descending colon and predominantly collapsed transverse colon with multiple diverticula. Fluid in the ascending colon which may be due to diarrhea. Normal appendix with the tip extending to the area of pericolonic inflammation. Normal caliber small bowel loops without findings of obstruction.    Osseous structures: Normal for age  Exam End: 12/19/24 12:45 PM Last Resulted: 12/19/24 1:14 PM   Received From: [facility] Result Received: 12/22/24 7:06 PM     Reviewing of images not possible as studies from outside of the system. He will bring us the CT images on disc.    ________________________________________________________________________  ASSESSMENT/ PLAN: Sigmoid Diverticulitis, first bout, essentially resolved with antibiotic therapy    We discussed the natural history and pathophysiology of diverticulitis. We discussed the likelihood of additional bouts, complications such as perforation with peritonitis, obstruction from stricture formation, fistulas, abscess, bleeding, etc. His young age and family history were discussed, putting him at high risk for additional episodes. Furthermore he had what appears to be a contained microperforation at this episode. The role and timing of elective resection relative to the most recent episode was reviewed. We discussed the current plan of finishing IV antibiotics, repeat CT, colonoscopy, and then surgery. There was a concern about the possibility of the colonoscopy being detrimental to his recent episode  of perforated diverticulitis. We discussed that we typically would do a colonoscopy after a period of healing, typically no sooner than 6 weeks after the attack. We discussed the role of observation, and we do not favor it based on the above features of his situation.    He will finish the IV antibiotics through the ID doctor. He was given gastroenterology names for colonoscopy. We will track his follow-up CT scan, to be done at [facility]. He will get the images of the prior CT on disc, for uploading, review, and comparison.    Recommend laparoscopic-assisted low anterior sigmoid resection. RBA disc including bleeding, infection, leak, recurrent attacks, stenosis, adhesions/SBO, open operation, anesthesia, recovery, hospital stay, RTW and activity, etc.  I recommend doing the surgery no sooner than 6 weeks from the attack. He is going to [place] at the end of March. If he does not do the procedure in early to mid March, we will do it after spring break.    , [facility], bowel prep, surgical assist, SCIP antibiotics, SCDs, preoperative subcutaneous heparin. Continue the bisoprolol, hold NSAIDs.    The diverticulitis brochure was given to the patient, and all questions were answered.  Thank you for involving me in the care of your patient.

## 2025-02-28 NOTE — ANESTHESIA PREPROCEDURE EVALUATION
Anesthesia PreOp Note    HPI:     Dung Silva is a 39 year old male who presents for preoperative consultation requested by: Teofilo Escobedo MD    Date of Surgery: 2/28/2025    Procedure(s):  Laparoscopic sigmoid colon resection, possible open  Indication: Diverticulitis    Relevant Problems   No relevant active problems       NPO:  Last Liquid Consumption Date: 02/27/25  Last Liquid Consumption Time: 0515  Last Solid Consumption Date: 02/26/25     Last Liquid Consumption Date: 02/27/25          History Review:  Patient Active Problem List    Diagnosis Date Noted    Mixed hyperlipidemia 10/27/2021    Tinnitus, left ear 02/17/2020    Dyshidrotic eczema 03/19/2014       Past Medical History:    Diverticulosis of large intestine    diverticulitis    Esophageal reflux    High blood pressure       Past Surgical History:   Procedure Laterality Date    Colonoscopy      Cystoscopy,insert ureteral stent         Prescriptions Prior to Admission[1]  Current Medications and Prescriptions Ordered in Epic[2]    Allergies[3]    Family History   Problem Relation Age of Onset    Other (Other) Father         alpha 1 antitrypsin    Hypertension Mother      Social History     Socioeconomic History    Marital status:    Tobacco Use    Smoking status: Some Days     Types: Cigarettes    Smokeless tobacco: Former     Quit date: 2/1/2020   Vaping Use    Vaping status: Never Used   Substance and Sexual Activity    Alcohol use: Yes     Comment: social    Drug use: No    Sexual activity: Yes     Partners: Female       Available pre-op labs reviewed.             Vital Signs:  Body mass index is 31.57 kg/m².   height is 1.778 m (5' 10\") and weight is 99.8 kg (220 lb).   Vitals:    02/24/25 0849   Weight: 99.8 kg (220 lb)   Height: 1.778 m (5' 10\")        Anesthesia Evaluation     Patient summary reviewed and Nursing notes reviewed    Airway   Mallampati: II  TM distance: >3 FB  Neck ROM: full  Dental - Dentition appears grossly intact      Pulmonary - negative ROS and normal exam   Cardiovascular - normal exam  Exercise tolerance: good  (+) hypertension    NYHA Classification: I    Neuro/Psych - negative ROS     GI/Hepatic/Renal - negative ROS   (+) GERD    Endo/Other - negative ROS   Abdominal  - normal exam                 Anesthesia Plan:   ASA:  2  Plan:   General  Airway:  ETT  Post-op Pain Management: IV analgesics  Informed Consent Plan and Risks Discussed With:  Patient  Discussed plan with:  Attending, surgeon and CRNA      I have informed Dung Silva and/or legal guardian or family member of the nature of the anesthetic plan, benefits, risks including possible dental damage if relevant, major complications, and any alternative forms of anesthetic management.   All of the patient's questions were answered to the best of my ability. The patient desires the anesthetic management as planned.  SHAISTA EID MD  2/28/2025 6:59 AM  Present on Admission:  **None**           [1]   Medications Prior to Admission   Medication Sig Dispense Refill Last Dose/Taking    metroNIDAZOLE 500 MG Oral Tab Take 1PO the day before surgery at 5,7,9 PM   2/27/2025 Evening    neomycin 500 MG Oral Tab Take 2PO the day before surgery at 5,7,9 PM   2/27/2025 Evening    bisoprolol-hydrochlorothiazide 5-6.25 MG Oral Tab Take 1 tablet by mouth daily.   2/28/2025 Morning   [2]   Current Facility-Administered Medications Ordered in Epic   Medication Dose Route Frequency Provider Last Rate Last Admin    heparin (Porcine) 5000 UNIT/ML injection 5,000 Units  5,000 Units Subcutaneous Once Teofilo Escobedo MD        cefTRIAXone (Rocephin) 2 g in sodium chloride 0.9% 100 mL IVPB-ADDV  2 g Intravenous Once Teofilo Escobedo MD        metroNIDAZOLE in sodium chloride 0.79% (Flagyl) 5 mg/mL IVPB premix 500 mg  500 mg Intravenous Once Teofilo Escobedo MD        sodium chloride 0.9% infusion   Intravenous Continuous Teofilo Escobedo MD         No current [facility]-ordered outpatient  medications on file.   [3]   Allergies  Allergen Reactions    Lisinopril PALPITATIONS and OTHER (SEE COMMENTS)     Night terrors, sweats    Codeine RASH

## 2025-02-28 NOTE — H&P
[facility] Hospitalist H&P       CC: abd pain       PCP: Avel Sanchez DO    History of Present Illness: Mr. Silva is a 39 year old male with PMH sig for HTN, and hs of perforated diverticulitis w/ bacteremia in Dec who presented for laparoscopic sigmoid resection.  Patient is currently post op, notes mild lower quadrant abd pain, no nausea, no vomiting, no cp or sob.        PMH  Past Medical History:    Diverticulosis of large intestine    diverticulitis    Esophageal reflux    High blood pressure        PSH  Past Surgical History:   Procedure Laterality Date    Colonoscopy      Cystoscopy,insert ureteral stent          ALL:  Allergies[1]     Home Medications:  Medications Taking[2]      Soc Hx  Social History     Tobacco Use    Smoking status: Some Days     Types: Cigarettes    Smokeless tobacco: Former     Quit date: 2/1/2020   Substance Use Topics    Alcohol use: Yes     Comment: social        Fam Hx  Family History   Problem Relation Age of Onset    Other (Other) Father         alpha 1 antitrypsin    Hypertension Mother        Review of Systems  Comprehensive ROS reviewed and negative except for what's stated above.     OBJECTIVE:  /85 (BP Location: Right arm)   Pulse 82   Temp 97.9 °F (36.6 °C) (Temporal)   Resp 14   Ht 5' 10\" (1.778 m)   Wt 219 lb (99.3 kg)   SpO2 99%   BMI 31.42 kg/m²   General:  Alert, no distress   Head:  Normocephalic, without obvious abnormality, atraumatic.   Eyes:  Sclera anicteric, No conjunctival pallor,   Nose: Nares normal. Septum midline. Mucosa normal. No drainage.   Throat: Lips, mucosa, and tongue normal. Teeth and gums normal.   Neck: Supple,   Lungs:   Clear to auscultation bilaterally. Normal effort   Chest wall:  No tenderness or deformity.   Heart:  Regular rate and rhythm, S1, S2 normal, no murmur, rub or gallop appreciated   Abdomen:   Soft, tender to palpation, bs noted, dressing in place    Extremities: Extremities normal, atraumatic, no  cyanosis or edema.   Skin: Skin color, texture, turgor normal. No rashes or lesions.    Neurologic: Normal strength, no focal deficit appreciated     Diagnostic Data:    CBC/Chem  No results for input(s): \"WBC\", \"HGB\", \"MCV\", \"PLT\", \"BAND\", \"INR\" in the last 168 hours.    Invalid input(s): \"LYM#\", \"MONO#\", \"BASOS#\", \"EOSIN#\"    No results for input(s): \"NA\", \"K\", \"CL\", \"CO2\", \"BUN\", \"CREATSERUM\", \"GLU\", \"CA\", \"CAION\", \"MG\", \"PHOS\" in the last 168 hours.    No results for input(s): \"ALT\", \"AST\", \"ALB\", \"AMYLASE\", \"LIPASE\", \"LDH\" in the last 168 hours.    Invalid input(s): \"ALPHOS\", \"TBIL\", \"DBIL\", \"TPROT\"    No results for input(s): \"TROP\" in the last 168 hours.       Radiology: No results found.      ASSESSMENT / PLAN:   Mr. Silva is a 39 year old male with PMH sig for HTN, and hs of perforated diverticulitis w/ bacteremia in Dec who presented for laparoscopic sigmoid resection.      Hs of perforated diverticulitis  S/p laparoscopic sigmoid resection  - oral for pain control wean as able  - Adv diet, zofran for nausea, OBR  - PT/OT/SW  - monitor for acute blood loss anemia, cbc in am  - heparin and SCD for DVT prophy  - further management per surgery    HTN  - hold meds for now     FN:  - IVF:per surgery  - Diet: adv per surgery     DVT Prophy:scd, heparin  Lines: PIV    Dispo: pending clinical course    Outpatient records or previous hospital records reviewed.     Further recommendations pending patient's clinical course.  [facility] hospitalist to continue to follow patient while in house    Patient and/or patient's family given opportunity to ask questions and note understanding and agreeing with therapeutic plan as outlined    Thank You,  George Flynn MD    Hospitalist with [facility]  Answering Service number: [phone]         [1]   Allergies  Allergen Reactions    Lisinopril PALPITATIONS and OTHER (SEE COMMENTS)     Night terrors, sweats    Codeine RASH   [2]   Outpatient Medications Marked as Taking for  the 2/28/25 encounter (Hospital Encounter)   Medication Sig Dispense Refill    metroNIDAZOLE 500 MG Oral Tab Take 1PO the day before surgery at 5,7,9 PM      neomycin 500 MG Oral Tab Take 2PO the day before surgery at 5,7,9 PM      bisoprolol-hydrochlorothiazide 5-6.25 MG Oral Tab Take 1 tablet by mouth daily.

## 2025-02-28 NOTE — ANESTHESIA PROCEDURE NOTES
Airway  Date/Time: 2/28/2025 7:41 AM  Urgency: Elective    Airway not difficult    General Information and Staff    Patient location during procedure: OR  Anesthesiologist: Maximus Downs MD  Resident/CRNA: Jasmina Sahu CRNA  Performed: CRNA   Performed by: Jasmina Sahu CRNA  Authorized by: Maximus Downs MD      Indications and Patient Condition  Indications for airway management: anesthesia  Sedation level: deep  Preoxygenated: yes  Patient position: sniffing  Mask difficulty assessment: 1 - vent by mask    Final Airway Details  Final airway type: endotracheal airway      Successful airway: ETT  Cuffed: yes   Successful intubation technique: direct laryngoscopy  Endotracheal tube insertion site: oral  Blade: Armand  Blade size: #3  ETT size (mm): 7.5    Cormack-Lehane Classification: grade I - full view of glottis  Placement verified by: capnometry   Cuff volume (mL): 10  Measured from: lips  ETT to lips (cm): 23  Number of attempts at approach: 1  Number of other approaches attempted: 0

## 2025-02-28 NOTE — INTERVAL H&P NOTE
Pre-op Diagnosis: Diverticulitis    The above referenced H&P was reviewed by Teofilo Escobedo MD on 2/28/2025, the patient was examined and no significant changes have occurred in the patient's condition since the H&P was performed.  I discussed with the patient and/or legal representative the potential benefits, risks and side effects of this procedure; the likelihood of the patient achieving goals; and potential problems that might occur during recuperation.  I discussed reasonable alternatives to the procedure, including risks, benefits and side effects related to the alternatives and risks related to not receiving this procedure.  We will proceed with procedure as planned.    No changes today.  He feels back to normal.  The bowel prep went well.  The surgical plan was reviewed with the patient and his wife, Urszula.  We discussed surgery, anesthesia, hospital stay, resumption of diet, prevention of leak, activity, return to normal activity, long-term bowel function, etc.  The colonoscopy looked good, report reviewed.  The interval CT images look good, images reviewed last evening.

## 2025-02-28 NOTE — OPERATIVE REPORT
[facility]  (part of [facility])         Patient Name: Dung Silva   MRN: [id number]     Date of Operation:  2025   Site:  [facility] (part of [facility])       : 3/25/1985       PREOPERATIVE DIAGNOSES:  Sigmoid diverticulitis with microperforation     POSTOPERATIVE DIAGNOSES:  Same     PROCEDURE PERFORMED: Laparoscopic assisted low anterior sigmoid resection, rigid proctoscopy, Spy fluorescence imaging of intestinal blood supply     SURGEON: Teofilo Fernandez M.D.      ASSISTANT: Penny Balderas CSA  (skilled services required for positioning, prepping, draping, setting up the field, exposing, retracting, suturing, running the camera, closing, dressing, etc.)     ANESTHESIA: General     COMPLICATIONS: None     ESTIMATED BLOOD LOSS: 5 mL     SPECIMEN: Sigmoid colon and anastomotic rings     IMPLANTS: None     GRAFTS: None      DRAINS: None     BLOOD PRODUCTS ADMINISTERED: None     HISTORY: This patient is a 39 year old-year-old male who was recently admitted to an outside facility with severe sigmoid diverticulitis with microperforation.  He was managed with IV antibiotics and bowel rest, and recovered.  He has undergone interval CT scan and recent colonoscopy. He was seen in surgical consultation, and laparoscopic low anterior sigmoid resection was recommended. The risks, benefits, and alternatives were discussed.      FINDINGS: Area of healed perforation in the mid sigmoid colon     PROCEDURE:  The patient was seen in the preoperative holding area with his wife, Urszula. The surgical plan was reviewed.  By history, the bowel prep went well.  We reviewed the interval CT results and the colonoscopy results.  The CT images were reviewed.  He was taken to the operating room, placed in the supine position, and administered general anesthesia.  He was converted to the lithotomy position.  A 12 Spanish Villarreal catheter was inserted as he had a meatal stenosis as documented  by Dr. Victor, emptying the bladder. The abdomen and perineum were prepped and draped in standard fashion.  A timeout was performed.  Local anesthetic was infiltrated.  A Veress needle was introduced at the top of the umbilicus.  The abdomen was insufflated with carbon dioxide gas.  A 5 mm blunt tipped VISI trocar was inserted, followed by the scope.  There was no entry injury.  Two additional 5 mm trochars were placed in the right lower quadrant under vision.  The abdomen and pelvis was inspected.  There were no adhesions.  The omentum covered all of the viscera.  Upon repositioning the patient, the visualized small intestine, stomach, liver and colon with the exception of the sigmoid were unremarkable.  At the mid sigmoid colon, there was noted to be an area of scarring consistent with a prior healed inflammatory process.  The remaining inspection of the peritoneal compartment was unremarkable.  He was repositioned for left lower quadrant exposure.  To facilitate tension-free anastomosis, the sigmoid colon and descending colon were mobilized from the left sidewall.  Care was used to stay in the peritoneal plane, leaving the peritoneum intact.  Care was used to identify and preserve the iliac vessels and ureters during the dissection.  The dissection was carried proximally along the lateral descending colon along the line of Toldt.  The descending colon was mobilized from lateral to medial, dissecting its mesentery off the retroperitoneum and Gerota's fascia.  Dissection was carried into the pelvis, where the sigmoid and rectosigmoid was mobilized from lateral attachments.  The sigmoid colon was retracted out of the pelvis.  A window was made through the mesentery of the upper rectum with LigaSure.  The excess fat was cleared from the circumference of the upper rectum, which was free of inflammatory changes and was soft and supple.  The lowest right lower quadrant trocar was upsized to a 12 mm trocar.  The  CovLifeLocken stapler was introduced.  A 60 mm purple load of the stapler was used to transect the upper rectum with a single firing perpendicular to the axis of the rectum.  The mesentery of the rectosigmoid and sigmoid colon was divided with harmonic scalpel from distal to proximal, reaching a point where the distal descending colon was normal-appearing without inflammatory changes, and the mesentery was thin and uninflamed.  3 mL of indocyanine green was administered intravenously.  Spy fluorescence imaging was used to assess the viability of the colon, and the line of vascular demarcation was marked.  The peritoneal cavity was inspected for hemostasis.  A muscle-sparing short transverse incision was made over the left rectus muscle in the left lower quadrant.  The muscle was mobilized within the sheath, and the peritoneal cavity was entered.  An Albaro retractor was placed for wound retraction and protection.  The sigmoid colon was delivered through the retractor.  A site was selected proximal to the line of vascular demarcation where the sigmoid colon was divided.  The specimen was approximately 25 cm in length and exhibited the above-mentioned inflammatory findings near the midpoint of the specimen.  It was forwarded to pathology with the subsequent anastomotic rings.  The open end of the bowel confirmed an adequate bowel prep.  There were no visible diverticuli.  There were no mucosal or inflammatory changes.  The sizers were used, and a 29 mm EEA was selected.  The anvil was secured in the open end of the bowel with a pursestring of PDS suture.  The excess fat was cleared from overlying the anvil strike plate.  The anvil was returned to the abdominal cavity.  The field was redraped, the disposable instruments were changed, and the surgeon's gowns and gloves were changed.  The wound was irrigated and rendered hemostatic.  It was closed in 2 layers with Vicryl for the posterior rectus and anterior rectus fascial  layers.  The peritoneal cavity was reinsufflated with carbon dioxide.  Inspection confirmed hemostasis.  The rectal stump was insufflated under saline and noted to be airtight.  The sizers were used to gently dilate the rectal stump to the staple line.  The stapler was introduced and advanced to the staple line without event under vision.  The spike was brought out adjacent to the staple line.  The anvil was mated to the stapler spike, and the stapler was closed under vision to ensure there was no entrapment of adjacent structures.  Both ends of the bowel had visibly excellent viability.  There was no twisting.  There was no tension.  The stapler was fired and removed under vision.  It yielded 2 full-thickness circumferential rings which were included with the pathologic specimen.  3 mL of indocyanine green was administered, and spy fluorescence imaging was used to confirm excellent blood supply on both sides of the anastomosis.  The anastomosis was insufflated under saline with the proximal bowel clamped, confirming there was no leaking.  The anastomosis was inspected with the proctoscope to confirm it was intact circumferentially without bleeding.  The anastomosis lay flush with the retroperitoneum under no tension.  The abdomen and pelvis was copiously irrigated and aspirated.  It was confirmed to be hemostatic.  The omentum was brought beneath the anterior abdominal wall and positioned into the pelvis.  The underside of the open incision was inspected and confirmed to be free of entrapment.  The right lower quadrant 12 mm trocar site was closed with a figure-of-eight 0 Vicryl under vision.  The remaining trochars were removed under direct vision internally to assure hemostasis.  The abdomen was desufflated.  The wounds were irrigated, rendered hemostatic, closed with Vicryl subcuticular stitches, and dressed with Dermabond.  The needle, sponge, and instrument counts were reported as correct.  He was awakened and  transported to recovery.  Urszula was notified of the findings and his status.        Teofilo Fernandez MD

## 2025-03-01 LAB
ANION GAP SERPL CALC-SCNC: 8 MMOL/L (ref 0–18)
BASOPHILS # BLD AUTO: 0.06 X10(3) UL (ref 0–0.2)
BASOPHILS NFR BLD AUTO: 0.5 %
BUN BLD-MCNC: 9 MG/DL (ref 9–23)
BUN/CREAT SERPL: 9.5 (ref 10–20)
CALCIUM BLD-MCNC: 8.6 MG/DL (ref 8.7–10.4)
CHLORIDE SERPL-SCNC: 108 MMOL/L (ref 98–112)
CO2 SERPL-SCNC: 25 MMOL/L (ref 21–32)
CREAT BLD-MCNC: 0.95 MG/DL
DEPRECATED RDW RBC AUTO: 40 FL (ref 35.1–46.3)
EGFRCR SERPLBLD CKD-EPI 2021: 103 ML/MIN/1.73M2 (ref 60–?)
EOSINOPHIL # BLD AUTO: 0.17 X10(3) UL (ref 0–0.7)
EOSINOPHIL NFR BLD AUTO: 1.5 %
ERYTHROCYTE [DISTWIDTH] IN BLOOD BY AUTOMATED COUNT: 12.5 % (ref 11–15)
GLUCOSE BLD-MCNC: 93 MG/DL (ref 70–99)
HCT VFR BLD AUTO: 39.3 %
HGB BLD-MCNC: 13.7 G/DL
IMM GRANULOCYTES # BLD AUTO: 0.04 X10(3) UL (ref 0–1)
IMM GRANULOCYTES NFR BLD: 0.3 %
LYMPHOCYTES # BLD AUTO: 2.99 X10(3) UL (ref 1–4)
LYMPHOCYTES NFR BLD AUTO: 25.6 %
MAGNESIUM SERPL-MCNC: 2 MG/DL (ref 1.6–2.6)
MCH RBC QN AUTO: 30.7 PG (ref 26–34)
MCHC RBC AUTO-ENTMCNC: 34.9 G/DL (ref 31–37)
MCV RBC AUTO: 88.1 FL
MONOCYTES # BLD AUTO: 1.08 X10(3) UL (ref 0.1–1)
MONOCYTES NFR BLD AUTO: 9.2 %
NEUTROPHILS # BLD AUTO: 7.35 X10 (3) UL (ref 1.5–7.7)
NEUTROPHILS # BLD AUTO: 7.35 X10(3) UL (ref 1.5–7.7)
NEUTROPHILS NFR BLD AUTO: 62.9 %
OSMOLALITY SERPL CALC.SUM OF ELEC: 290 MOSM/KG (ref 275–295)
PHOSPHATE SERPL-MCNC: 2.8 MG/DL (ref 2.4–5.1)
PLATELET # BLD AUTO: 270 10(3)UL (ref 150–450)
POTASSIUM SERPL-SCNC: 3.4 MMOL/L (ref 3.5–5.1)
RBC # BLD AUTO: 4.46 X10(6)UL
SODIUM SERPL-SCNC: 141 MMOL/L (ref 136–145)
WBC # BLD AUTO: 11.7 X10(3) UL (ref 4–11)

## 2025-03-01 PROCEDURE — 84100 ASSAY OF PHOSPHORUS: CPT | Performed by: SURGERY

## 2025-03-01 PROCEDURE — 80048 BASIC METABOLIC PNL TOTAL CA: CPT | Performed by: SURGERY

## 2025-03-01 PROCEDURE — 85025 COMPLETE CBC W/AUTO DIFF WBC: CPT | Performed by: SURGERY

## 2025-03-01 PROCEDURE — 83735 ASSAY OF MAGNESIUM: CPT | Performed by: SURGERY

## 2025-03-01 RX ORDER — METOPROLOL TARTRATE 50 MG
50 TABLET ORAL
Status: DISCONTINUED | OUTPATIENT
Start: 2025-03-01 | End: 2025-03-01

## 2025-03-01 RX ORDER — BISOPROLOL FUMARATE AND HYDROCHLOROTHIAZIDE 5; 6.25 MG/1; MG/1
1 TABLET ORAL DAILY
Status: DISCONTINUED | OUTPATIENT
Start: 2025-03-01 | End: 2025-03-02

## 2025-03-01 RX ORDER — KETOROLAC TROMETHAMINE 15 MG/ML
15 INJECTION, SOLUTION INTRAMUSCULAR; INTRAVENOUS EVERY 6 HOURS PRN
Status: DISCONTINUED | OUTPATIENT
Start: 2025-03-01 | End: 2025-03-02

## 2025-03-01 RX ORDER — POTASSIUM CHLORIDE 1500 MG/1
40 TABLET, EXTENDED RELEASE ORAL ONCE
Status: COMPLETED | OUTPATIENT
Start: 2025-03-01 | End: 2025-03-01

## 2025-03-01 RX ORDER — KETOROLAC TROMETHAMINE 30 MG/ML
30 INJECTION, SOLUTION INTRAMUSCULAR; INTRAVENOUS EVERY 6 HOURS PRN
Status: DISCONTINUED | OUTPATIENT
Start: 2025-03-01 | End: 2025-03-02

## 2025-03-01 RX ORDER — HYDROCHLOROTHIAZIDE 12.5 MG/1
6.25 TABLET ORAL
Status: DISCONTINUED | OUTPATIENT
Start: 2025-03-01 | End: 2025-03-01

## 2025-03-01 NOTE — PLAN OF CARE
Tolerating clear liquids, ERAS, corley care, up to chair and ambulating in hallway. No gas/no belching, scheduled tylenol, motrin for pain control. Wife at bedside.  Plan for discharge home when medically stable.    Problem: Patient Centered Care  Goal: Patient preferences are identified and integrated in the patient's plan of care  Description: Interventions:  - What would you like us to know as we care for you? -  - Provide timely, complete, and accurate information to patient/family  - Incorporate patient and family knowledge, values, beliefs, and cultural backgrounds into the planning and delivery of care  - Encourage patient/family to participate in care and decision-making at the level they choose  - Honor patient and family perspectives and choices  Outcome: Progressing     Problem: Patient/Family Goals  Goal: Patient/Family Long Term Goal  Description: Patient's Long Term Goal: -    Interventions:  - -  - See additional Care Plan goals for specific interventions  Outcome: Progressing  Goal: Patient/Family Short Term Goal  Description: Patient's Short Term Goal: -    Interventions:   - -  - See additional Care Plan goals for specific interventions  Outcome: Progressing

## 2025-03-01 NOTE — PLAN OF CARE
Patient's pain managed with oxy prn, and sched tylenol. Cherelle cotton, dc this am. IVF infusing, Tolerating clears ERAS, denies of nausea. Safety precautions in place.     Problem: Patient Centered Care  Goal: Patient preferences are identified and integrated in the patient's plan of care  Description: Interventions:  - What would you like us to know as we care for you?   - Provide timely, complete, and accurate information to patient/family  - Incorporate patient and family knowledge, values, beliefs, and cultural backgrounds into the planning and delivery of care  - Encourage patient/family to participate in care and decision-making at the level they choose  - Honor patient and family perspectives and choices  Outcome: Progressing

## 2025-03-01 NOTE — PROGRESS NOTES
[facility] Hospitalist Progress Note     CC: Hospital Follow up    PCP: Avel Sanchez DO       Assessment/Plan:     Active Problems:    Preop testing    Mr. Silva is a 39 year old male with PMH sig for HTN, and hs of perforated diverticulitis w/ bacteremia in Dec who presented for laparoscopic sigmoid resection.       Hs of perforated diverticulitis  S/p laparoscopic sigmoid resection  - oral for pain control wean as able  - Adv diet, zofran for nausea, OBR  - PT/OT/SW  - monitor for acute blood loss anemia, cbc in am  - heparin and SCD for DVT prophy  - further management per surgery     HTN  - hold meds for now      FN:  - IVF:per surgery  - Diet: adv per surgery      DVT Prophy:scd, heparin  Lines: PIV     Dispo: pending clinical course, possible home tomorrow     Outpatient records or previous hospital records reviewed.      Further recommendations pending patient's clinical course.  [facility] hospitalist to continue to follow patient while in house     Patient and/or patient's family given opportunity to ask questions and note understanding and agreeing with therapeutic plan as outlined     Lakshmi Rodgers MD  Hospitalist with [facility] Service number: [phone]     Subjective:     Having moderate abdominal pain. Tolerating clear liquids    OBJECTIVE:    Blood pressure 133/81, pulse 64, temperature 98.4 °F (36.9 °C), temperature source Oral, resp. rate 17, height 5' 10\" (1.778 m), weight 219 lb (99.3 kg), SpO2 99%.    Temp:  [96.7 °F (35.9 °C)-99 °F (37.2 °C)] 98.4 °F (36.9 °C)  Pulse:  [56-88] 64  Resp:  [8-24] 17  BP: (123-160)/() 133/81  SpO2:  [95 %-100 %] 99 %      Intake/Output:    Intake/Output Summary (Last 24 hours) at 3/1/2025 0727  Last data filed at 3/1/2025 0422  Gross per 24 hour   Intake 1620 ml   Output 2430 ml   Net -810 ml       Last 3 Weights   02/28/25 0702 219 lb (99.3 kg)   02/24/25 0849 220 lb (99.8 kg)   11/30/21 1539 220 lb 12.8 oz (100.2 kg)   10/26/21 1459  215 lb (97.5 kg)       Exam   GEN: male in NAD  HEENT: EOMI  Pulm: CTAB, no crackles or wheezes  CV: RRR, no murmurs  ABD: Soft, mild TTP, non-distended, +BS  SKIN: warm, dry  EXT: no edema      Data Review:       Labs:     Recent Labs   Lab 03/01/25  0700   RBC 4.46   HGB 13.7   HCT 39.3   MCV 88.1   MCH 30.7   MCHC 34.9   RDW 12.5   NEPRELIM 7.35   WBC 11.7*   .0         No results for input(s): \"GLU\", \"BUN\", \"CREATSERUM\", \"GFRAA\", \"GFRNAA\", \"EGFRCR\", \"CA\", \"NA\", \"K\", \"CL\", \"CO2\" in the last 168 hours.    No results for input(s): \"ALT\", \"AST\", \"ALB\", \"AMYLASE\", \"LIPASE\", \"LDH\" in the last 168 hours.    Invalid input(s): \"ALPHOS\", \"TBIL\", \"DBIL\", \"TPROT\"      Imaging:  No results found.      Meds:     INPATIENT MEDICATIONS    Scheduled Medications:      heparin, 5,000 Units, Q8H CAIN  magnesium oxide, 400 mg, Daily  acetaminophen, 1,000 mg, TID            Drips:  sodium chloride, Last Rate: 100 mL/hr at 02/28/25 0736  lactated ringers, Last Rate: 2 mL/kg/hr (02/28/25 1146)        PRN Medications  oxyCODONE, 5 mg, Q4H PRN   Or  oxyCODONE, 10 mg, Q4H PRN  HYDROmorphone, 0.4 mg, Q2H PRN   Or  HYDROmorphone, 0.8 mg, Q2H PRN  melatonin, 3 mg, Nightly PRN  ondansetron, 4 mg, Q6H PRN  ibuprofen, 600 mg, Q6H PRN  traMADol, 50 mg, Q6H PRN

## 2025-03-01 NOTE — PROGRESS NOTES
[facility]  part of [facility]    Progress Note    Dung Silav Patient Status:  Inpatient    3/25/1985 MRN [id number]   Location [facility] 4W/SW/SE Attending Teofilo Escobedo MD   Hosp Day # 1 PCP Avel Sanchez,      Subjective:   Sitting up in the chair.  Tolerating clear liquid diet.  Passed a small amount of flatus.  No bowel movement.  No nausea.  Appetite so-so.  States pain medications never worked for him.  Pain not bad at rest, but severe with coughing.  Had the Villarreal catheter removed, voiding okay.  Ambulating.      Objective:   Vital Signs:  Blood pressure 133/81, pulse 64, temperature 98.4 °F (36.9 °C), temperature source Oral, resp. rate 17, height 5' 10\" (1.778 m), weight 219 lb (99.3 kg), SpO2 99%.     General:  No acute distress. Alert and oriented x 3.  HEENT:  Moist mucous membranes.  Neck:  Soft neck.    Respiratory: Good air entry bilaterally, symmetrical expansion.  Cardiovascular:  Regular rate and rhythm.  No JVD.  Abdomen:  Soft, nontender, mildly distended.  Incisions intact with glue, no redness or drainage.  Neurologic:  No focal neurological deficits.  Musculoskeletal:  Full range of motion of all extremities.  No swelling noted.        Intake/Output Summary (Last 24 hours) at 3/1/2025 1120  Last data filed at 3/1/2025 1004  Gross per 24 hour   Intake 420 ml   Output 2830 ml   Net -2410 ml        Results:   Lab Results   Component Value Date    WBC 11.7 (H) 2025    HGB 13.7 2025    HCT 39.3 2025    .0 2025    CREATSERUM 0.95 2025    BUN 9 2025     2025    K 3.4 (L) 2025     2025    CO2 25.0 2025    GLU 93 2025    CA 8.6 (L) 2025    ALB 4.9 2021    ALKPHO 83 2021    BILT 1.14 2021    TP 7.5 2021    AST 32 2021    ALT 69 (H) 2021    TSH 1.440 2021    DDIMER <0.27 10/18/2021    MG 2.0 2025    PHOS 2.8 2025     TROP <0.045 10/18/2021       Pathology pending.      Assessment & Plan:   Status post laparoscopic low anterior sigmoid colon resection--postoperative day #1    Doing well.  On ERAS principles.  Full liquid diet, encourage ambulation, abdominal binder.  Will add Toradol to his pain regimen.  Possible discharge tomorrow if continues to do well.    Teofilo Fernandez MD  3/1/2025

## 2025-03-02 VITALS
BODY MASS INDEX: 31.35 KG/M2 | RESPIRATION RATE: 18 BRPM | SYSTOLIC BLOOD PRESSURE: 147 MMHG | TEMPERATURE: 98 F | HEART RATE: 56 BPM | WEIGHT: 219 LBS | DIASTOLIC BLOOD PRESSURE: 105 MMHG | HEIGHT: 70 IN | OXYGEN SATURATION: 99 %

## 2025-03-02 LAB — POTASSIUM SERPL-SCNC: 4.1 MMOL/L (ref 3.5–5.1)

## 2025-03-02 PROCEDURE — 84132 ASSAY OF SERUM POTASSIUM: CPT | Performed by: HOSPITALIST

## 2025-03-02 RX ORDER — POLYETHYLENE GLYCOL 3350 17 G/17G
17 POWDER, FOR SOLUTION ORAL DAILY PRN
Qty: 10 EACH | Refills: 0 | Status: SHIPPED | OUTPATIENT
Start: 2025-03-02

## 2025-03-02 RX ORDER — TRAMADOL HYDROCHLORIDE 50 MG/1
TABLET ORAL EVERY 6 HOURS PRN
Qty: 20 TABLET | Refills: 0 | Status: SHIPPED | OUTPATIENT
Start: 2025-03-02

## 2025-03-02 NOTE — PLAN OF CARE
Patient is A/ox4, up self, ambulates room and hallway independently, denies nausea, received PRN Tramadol and scheduled Tylenol for pain management, saline locked, voiding freely, x1 BM overnight per patient, vital signs stable, no acute changes overnight. Call light within reach, safety measures in place, frequent rounding done, plan of care continued.

## 2025-03-02 NOTE — PROGRESS NOTES
[facility]  part of [facility]    Progress Note    Dung Silva Patient Status:  Inpatient    3/25/1985 MRN [id number]   Location [facility] 4W/SW/SE Attending Teofilo Escobedo MD   Hosp Day # 2 PCP Avel Sanchez,      Subjective:   No complaints.  Feeling significantly better today.  Had bowel movements with old dark liquidy output.  Passing gas.  The distention has gone down.  Tolerating soft diet, though being careful with it.  Pain responded well to tramadol.  Also taking Tylenol.  The wincing pain with coughing is much better, and the binder is helping him with that and ambulation.  Unaccompanied.    Objective:   Vital Signs:  Blood pressure (!) 137/94, pulse 59, temperature 98.7 °F (37.1 °C), temperature source Oral, resp. rate 18, height 5' 10\" (1.778 m), weight 219 lb (99.3 kg), SpO2 100%.     General:  No acute distress. Alert and oriented x 3.  Sitting in the chair.  HEENT:  Moist mucous membranes.  Neck:  Soft neck.    Respiratory: Good air entry bilaterally, symmetrical expansion.  Cardiovascular:  Regular rate and rhythm.  No JVD.  Abdomen:  Soft, nontender, less distended.  Incisions intact with glue, no redness or drainage.  Neurologic:  No focal neurological deficits.  Musculoskeletal:  Full range of motion of all extremities.  No swelling noted.        Intake/Output Summary (Last 24 hours) at 3/2/2025 1045  Last data filed at 3/1/2025 1802  Gross per 24 hour   Intake 540 ml   Output 200 ml   Net 340 ml        Results:   Lab Results   Component Value Date    WBC 11.7 (H) 2025    HGB 13.7 2025    HCT 39.3 2025    .0 2025    CREATSERUM 0.95 2025    BUN 9 2025     2025    K 4.1 2025     2025    CO2 25.0 2025    GLU 93 2025    CA 8.6 (L) 2025    ALB 4.9 2021    ALKPHO 83 2021    BILT 1.14 2021    TP 7.5 2021    AST 32 2021    ALT 69 (H) 2021     TSH 1.440 09/29/2021    DDIMER <0.27 10/18/2021    MG 2.0 03/01/2025    PHOS 2.8 03/01/2025    TROP <0.045 10/18/2021       Final Diagnosis:      Sigmoid colon; laparoscopic colon resection:   Segment of colon with diverticulosis.  Surgical resection margins are viable.          Assessment & Plan:   Status post laparoscopic low anterior sigmoid resection--postoperative day #2    Doing well.  Ileus resolved.  On soft diet.  Pain better managed today, tramadol has helped.  For discharge.  He was given a prescription for tramadol for breakthrough pain.  Encouraged to take Tylenol and ibuprofen.  We discussed diet, he plans to stay on a soft diet for now.  We discussed probiotics, yogurt, etc.  He does not need any antibiotics at discharge.  Okay to shower.  No lifting over 10 pounds for 2 weeks.  No exercise more vigorous than walking for 2 weeks.    Teofilo Fernandez MD  3/2/2025

## 2025-03-02 NOTE — PLAN OF CARE
Tolerating full liquids, corley removed/voiding freely, passing little gas/no bm, prn pain medications, up to chair and ambulating in hallway.  Plan for low fiber/soft diet in am, discharge home when medically stable.    Problem: Patient Centered Care  Goal: Patient preferences are identified and integrated in the patient's plan of care  Description: Interventions:  - What would you like us to know as we care for you? -  - Provide timely, complete, and accurate information to patient/family  - Incorporate patient and family knowledge, values, beliefs, and cultural backgrounds into the planning and delivery of care  - Encourage patient/family to participate in care and decision-making at the level they choose  - Honor patient and family perspectives and choices  Outcome: Progressing     Problem: Patient/Family Goals  Goal: Patient/Family Long Term Goal  Description: Patient's Long Term Goal: -    Interventions:  - -  - See additional Care Plan goals for specific interventions  Outcome: Progressing  Goal: Patient/Family Short Term Goal  Description: Patient's Short Term Goal: -    Interventions:   - -  - See addtional Care Plan goals for specific interventions  Outcome: Progressing

## 2025-03-02 NOTE — DISCHARGE SUMMARY
General Medicine Discharge Summary     Patient ID:  Dung Silva  39 year old  3/25/1985    Admit date: 2/28/2025    Discharge date and time: 03/02/25    Attending Physician: Teofilo Escobedo MD     Primary Care Physician: Avel Sanchez DO     Reason for admission: diverticulitis     Discharge Diagnoses: Diverticulitis  Preop testing    Discharged Condition: stable    Disposition: home    Consults:   Consultants         Provider   Role Specialty     George Flynn MD      Consulting Physician HOSPITALIST              HPI: Per Dr. Flynn    Mr. Silva is a 39 year old male with PMH sig for HTN, and hs of perforated diverticulitis w/ bacteremia in Dec who presented for laparoscopic sigmoid resection.  Patient is currently post op, notes mild lower quadrant abd pain, no nausea, no vomiting, no cp or sob.      Hospital Course:     Mr. Silva is a 39 year old male with PMH sig for HTN, and hs of perforated diverticulitis w/ bacteremia in Dec who presented for laparoscopic sigmoid resection. Post op course without issues, stable for discharge home on 3/2/25.      Hs of perforated diverticulitis  S/p laparoscopic sigmoid resection  - oral for pain control wean as able  - Adv diet, zofran for nausea, OBR  - PT/OT/SW  - monitor for acute blood loss anemia, cbc in am  - heparin and SCD for DVT prophy  - further management per surgery     HTN  - hold meds for now      FN:  - IVF:per surgery  - Diet: adv per surgery       Exam  Exam   GEN: male in NAD  HEENT: EOMI  Pulm: CTAB, no crackles or wheezes  CV: RRR, no murmurs  ABD: Soft, mild TTP, non-distended, +BS  SKIN: warm, dry  EXT: no edema    Operative Procedures: Procedure(s) (LRB):  Laparoscopic sigmoid colon resection (N/A)     Imaging: No results found.        Home Medication Changes:     I reconciled current and discharge medications on day of discharge. These medication changes have been made as below         Medication List        START taking these medications       polyethylene glycol (PEG 3350) 17 g Pack  Commonly known as: Miralax  Take 17 g by mouth daily as needed.     traMADol 50 MG Tabs  Commonly known as: Ultram  Take 1-2 tablets ( mg total) by mouth every 6 (six) hours as needed for Pain.            CONTINUE taking these medications      bisoprolol-hydrochlorothiazide 5-6.25 MG Tabs  Commonly known as: Ziac            STOP taking these medications      metroNIDAZOLE 500 MG Tabs  Commonly known as: Flagyl     neomycin 500 MG Tabs  Commonly known as: Mycifradin               Where to Get Your Medications        These medications were sent to [id number] - [place], [place] - [address] [place] [address] AT [address] [place] [address], [phone], [phone] [address] [address] [address]      Phone: [phone]   polyethylene glycol (PEG 3350) 17 g Pack  traMADol 50 MG Tabs         Activity:  as instructed  Diet:  low fiber  Wound Care: keep wound clean and dry  Code Status: Full Code  O2: n/a    Follow-up with:    PCP   Specialist surgery       FU   Follow-up Information       Girma Ansari PA Follow up on 3/21/2025.    Specialty: Physician Assistant  Why: 3/21 at 8am for surgical follow up  Contact information:  [address] [facility]  [address] [place] [address] [phone]                             DC instructions:        Follow-up with labs: none    Total Time Coordinating Care: 31 minutes    Patient had opportunity to ask questions and state understand and agree with therapeutic plan as outlined      Lakshmi Rodgers MD  [facility] Hospitalist

## 2025-03-02 NOTE — DISCHARGE INSTRUCTIONS
Can take tylenol/motrin for pain control  No lifting anything greater than 10 pounds or vigorous exercise for 2 weeks

## 2025-03-02 NOTE — PLAN OF CARE
No acute changes today. Pain being managed by po tramadol. Lap sites c/d/I. Tolerating diet, no nausea. Voiding freely. Up independently. Plans to discharge home today. Call light within reach, frequent rounding.

## 2025-03-04 NOTE — PAYOR COMM NOTE
--------------  DISCHARGE REVIEW    Payor: [employer] [facility] [employer]  Subscriber #:  [id number]  Authorization Number: [id number]    Admit date: 2/28/25  Admit time:   5:58 AM  Discharge Date: 3/2/2025  4:56 PM     General Medicine Discharge Summary     Admit date: 2/28/2025    Discharge date and time: 03/02/25    Attending Physician: Teofilo Escobedo MD     Reason for admission: diverticulitis     Discharge Diagnoses: Diverticulitis  Preop testing    Discharged Condition: stable    Disposition: home  HPI: Per Dr. Flynn    Mr. Silva is a 39 year old male with PMH sig for HTN, and hs of perforated diverticulitis w/ bacteremia in Dec who presented for laparoscopic sigmoid resection.  Patient is currently post op, notes mild lower quadrant abd pain, no nausea, no vomiting, no cp or sob.      Hospital Course:     Mr. Silva is a 39 year old male with PMH sig for HTN, and hs of perforated diverticulitis w/ bacteremia in Dec who presented for laparoscopic sigmoid resection. Post op course without issues, stable for discharge home on 3/2/25.      Hs of perforated diverticulitis  S/p laparoscopic sigmoid resection  - oral for pain control wean as able  - Adv diet, zofran for nausea, OBR  - PT/OT/SW  - monitor for acute blood loss anemia, cbc in am  - heparin and SCD for DVT prophy  - further management per surgery     HTN  - hold meds for now      FN:  - IVF:per surgery  - Diet: adv per surgery       Exam  Exam   GEN: male in NAD  HEENT: EOMI  Pulm: CTAB, no crackles or wheezes  CV: RRR, no murmurs  ABD: Soft, mild TTP, non-distended, +BS  SKIN: warm, dry  EXT: no edema    Operative Procedures: Procedure(s) (LRB):  Laparoscopic sigmoid colon resection (N/A)     Activity:  as instructed  Diet:  low fiber  Wound Care: keep wound clean and dry  Code Status: Full Code  O2: n/a    DC instructions:

## (undated) DEVICE — MARYLAND JAW LAPAROSCOPIC SEALER/DIVIDER COATED: Brand: LIGASURE

## (undated) DEVICE — SUT COAT VCRL 0 27IN CT-1 ABSRB VLT 36MM 1/2

## (undated) DEVICE — TROCAR: Brand: KII® SLEEVE

## (undated) DEVICE — SUT PDS II 2-0 27IN ABSRB VLT L26MM CT-2

## (undated) DEVICE — PROVIDES A STERILE INTERFACE BETWEEN THE OPERATING ROOM SURGICAL LAMPS (NON-STERILE) AND THE SURGEON OR NURSE (STERILE).: Brand: STERION®CLAMP COVER FABRIC

## (undated) DEVICE — DRAPE,UNDRBUT,WHT GRAD PCH,CAPPORT,20/CS: Brand: MEDLINE

## (undated) DEVICE — GAMMEX® PI HYBRID SIZE 8, STERILE POWDER-FREE SURGICAL GLOVE, POLYISOPRENE AND NEOPRENE BLEND: Brand: GAMMEX

## (undated) DEVICE — DRAPE,ABDOMINAL,MAJOR,STERILE: Brand: MEDLINE

## (undated) DEVICE — SUT COAT VCRL + 0 54IN ABSRB UD ANTIBACT

## (undated) DEVICE — SUT COAT VCRL 2-0 27IN SH ABSRB UD 26MM 1/2

## (undated) DEVICE — ARTICULATING RELOAD WITH TRI-STAPLE TECHNOLOGY: Brand: ENDO GIA

## (undated) DEVICE — 40583 XL ADVANCED TRENDELENBURG POSITIONING KIT: Brand: 40583 XL ADVANCED TRENDELENBURG POSITIONING KIT

## (undated) DEVICE — UNDYED BRAIDED (POLYGLACTIN 910), SYNTHETIC ABSORBABLE SUTURE: Brand: COATED VICRYL

## (undated) DEVICE — 450 ML BOTTLE OF 0.05% CHLORHEXIDINE GLUCONATE IN 99.95% STERILE WATER FOR IRRIGATION, USP AND APPLICATOR.: Brand: IRRISEPT ANTIMICROBIAL WOUND LAVAGE

## (undated) DEVICE — ADHESIVE SKIN TOP FOR WND CLSR DERMBND ADV

## (undated) DEVICE — LEGGINGS, PAIR, 31X48, STERILE: Brand: MEDLINE

## (undated) DEVICE — DRAPE SHEET LG

## (undated) DEVICE — DRAPE,UTILITY,TAPE,15X26,STERILE: Brand: MEDLINE

## (undated) DEVICE — TROCAR: Brand: KII FIOS FIRST ENTRY

## (undated) DEVICE — KIT,ANTI FOG,W/SPONGE & FLUID,SOFT PACK: Brand: MEDLINE

## (undated) DEVICE — GAMMEX® PI HYBRID SIZE 7.5, STERILE POWDER-FREE SURGICAL GLOVE, POLYISOPRENE AND NEOPRENE BLEND: Brand: GAMMEX

## (undated) DEVICE — POWER SHELL: Brand: SIGNIA

## (undated) DEVICE — CIRCULAR MECH XL SEAL 29MM

## (undated) DEVICE — WOUND RETRACTOR AND PROTECTOR: Brand: ALEXIS O WOUND PROTECTOR-RETRACTOR

## (undated) DEVICE — SIGMOIDOSCOPE LIGHTED BIOSEAL

## (undated) DEVICE — SUT COAT VCRL + 2-0 18IN ABSRB UD ANTIBACT